# Patient Record
Sex: FEMALE | Race: AMERICAN INDIAN OR ALASKA NATIVE | NOT HISPANIC OR LATINO
[De-identification: names, ages, dates, MRNs, and addresses within clinical notes are randomized per-mention and may not be internally consistent; named-entity substitution may affect disease eponyms.]

---

## 2018-11-04 PROBLEM — Z00.00 ENCOUNTER FOR PREVENTIVE HEALTH EXAMINATION: Status: ACTIVE | Noted: 2018-11-04

## 2019-07-18 ENCOUNTER — APPOINTMENT (OUTPATIENT)
Dept: OBGYN | Facility: CLINIC | Age: 59
End: 2019-07-18
Payer: COMMERCIAL

## 2019-07-18 VITALS
SYSTOLIC BLOOD PRESSURE: 116 MMHG | WEIGHT: 120 LBS | HEIGHT: 63 IN | DIASTOLIC BLOOD PRESSURE: 70 MMHG | BODY MASS INDEX: 21.26 KG/M2

## 2019-07-18 DIAGNOSIS — Z01.419 ENCOUNTER FOR GYNECOLOGICAL EXAMINATION (GENERAL) (ROUTINE) W/OUT ABNORMAL FINDINGS: ICD-10-CM

## 2019-07-18 DIAGNOSIS — Z80.42 FAMILY HISTORY OF MALIGNANT NEOPLASM OF PROSTATE: ICD-10-CM

## 2019-07-18 DIAGNOSIS — Z80.8 FAMILY HISTORY OF MALIGNANT NEOPLASM OF OTHER ORGANS OR SYSTEMS: ICD-10-CM

## 2019-07-18 DIAGNOSIS — Z82.49 FAMILY HISTORY OF ISCHEMIC HEART DISEASE AND OTHER DISEASES OF THE CIRCULATORY SYSTEM: ICD-10-CM

## 2019-07-18 PROCEDURE — 82270 OCCULT BLOOD FECES: CPT

## 2019-07-18 PROCEDURE — 99203 OFFICE O/P NEW LOW 30 MIN: CPT

## 2019-07-18 NOTE — HISTORY OF PRESENT ILLNESS
[Approximately ___ (Month)] : the LMP was approximately [unfilled] month(s) ago [Normal Amount/Duration] : was of a normal amount and duration [Menarche Age: ____] : age at menarche was [unfilled] [Currently In Menopause] : currently in menopause [Experiencing Menopausal Sxs] : experiencing menopausal symptoms [Male ___] : [unfilled] male [Spotting Between  Menses] : no spotting between menses [Regular Cycle Intervals] : periods have been irregular [Menstrual Cramps] : no menstrual cramps [Sexually Active] : is not sexually active [Contraception] : does not use contraception

## 2019-07-18 NOTE — PHYSICAL EXAM
[Awake] : awake [Alert] : alert [Soft] : soft [Oriented x3] : oriented to person, place, and time [Atrophy] : atrophy [Tenderness] : tenderness [Dry Mucosa] : dry mucosa [No Bleeding] : there was no active vaginal bleeding [Pap Obtained] : a Pap smear was performed [Uterine Adnexae] : were not tender and not enlarged [Normal rectal exam] : was normal [Normal Brown Stool] : was normal and brown [Normal] : was normal [None] : there was no rectal mass  [Acute Distress] : no acute distress [Mass] : no breast mass [Nipple Discharge] : no nipple discharge [Axillary LAD] : no axillary lymphadenopathy [Tender] : non tender [No Tenderness] : no rectal tenderness [Occult Blood] : occult blood test from digital rectal exam was negative [Nl Sphincter Tone] : normal sphincter tone [Internal Hemorrhoid] : no internal hemorrhoids were present [External Hemorrhoid] : no external hemorrhoids were present [Skin Tags] : no residual hemorrhoidal skin tags [FreeTextEntry6] : UNREMAKABLE

## 2019-07-22 LAB — HPV HIGH+LOW RISK DNA PNL CVX: NOT DETECTED

## 2020-01-21 ENCOUNTER — APPOINTMENT (OUTPATIENT)
Dept: OBGYN | Facility: CLINIC | Age: 60
End: 2020-01-21
Payer: COMMERCIAL

## 2020-01-21 DIAGNOSIS — A63.0 ANOGENITAL (VENEREAL) WARTS: ICD-10-CM

## 2020-01-21 PROCEDURE — 99214 OFFICE O/P EST MOD 30 MIN: CPT | Mod: 25

## 2020-01-21 PROCEDURE — 11421 EXC H-F-NK-SP B9+MARG 0.6-1: CPT

## 2020-01-21 PROCEDURE — 99213 OFFICE O/P EST LOW 20 MIN: CPT | Mod: 25

## 2020-01-21 NOTE — PROCEDURE
[Vulvar Biopsy] : Vulvar Biopsy [] : in the Perineum [Sent to Histology] : the specimen was place in buffered formalin and sent for pathlogy [Punch] : punch biopsy [Silver Nitrate] : silver nitrate [No Complications] : there were no complications [Tolerated Well] : the patient tolerated the procedure well

## 2020-02-05 ENCOUNTER — TRANSCRIPTION ENCOUNTER (OUTPATIENT)
Age: 60
End: 2020-02-05

## 2020-03-12 NOTE — PHYSICAL EXAM
[Labia Majora] : labia major [de-identified] : 1-2 mm raised lesion , been around for a few weeks. the lesion on touch feels firm & cystic.

## 2020-03-12 NOTE — CHIEF COMPLAINT
[Follow Up] : follow up GYN visit [FreeTextEntry1] : PT IS HERE foe perineal pain and small palpable lesions.

## 2020-12-21 PROBLEM — Z01.419 ENCOUNTER FOR ANNUAL ROUTINE GYNECOLOGICAL EXAMINATION: Status: RESOLVED | Noted: 2019-07-18 | Resolved: 2020-12-21
